# Patient Record
Sex: MALE | Race: WHITE | NOT HISPANIC OR LATINO
[De-identification: names, ages, dates, MRNs, and addresses within clinical notes are randomized per-mention and may not be internally consistent; named-entity substitution may affect disease eponyms.]

---

## 2023-01-13 PROBLEM — Z00.00 ENCOUNTER FOR PREVENTIVE HEALTH EXAMINATION: Status: ACTIVE | Noted: 2023-01-13

## 2023-01-16 ENCOUNTER — APPOINTMENT (OUTPATIENT)
Dept: OTOLARYNGOLOGY | Facility: CLINIC | Age: 43
End: 2023-01-16
Payer: COMMERCIAL

## 2023-01-16 ENCOUNTER — NON-APPOINTMENT (OUTPATIENT)
Age: 43
End: 2023-01-16

## 2023-01-16 VITALS
BODY MASS INDEX: 23.62 KG/M2 | HEART RATE: 91 BPM | WEIGHT: 165 LBS | DIASTOLIC BLOOD PRESSURE: 84 MMHG | SYSTOLIC BLOOD PRESSURE: 117 MMHG | HEIGHT: 70 IN

## 2023-01-16 DIAGNOSIS — G47.30 SLEEP APNEA, UNSPECIFIED: ICD-10-CM

## 2023-01-16 PROCEDURE — 99203 OFFICE O/P NEW LOW 30 MIN: CPT | Mod: 25

## 2023-01-16 PROCEDURE — 31575 DIAGNOSTIC LARYNGOSCOPY: CPT

## 2023-01-16 NOTE — PHYSICAL EXAM
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 sensations intact.  [de-identified] : ALESSANDRA

## 2023-01-16 NOTE — ASSESSMENT
[FreeTextEntry1] : Reviewed and reconciled medications, allergies, PMHx, PSHx, SocHx, FMHx.\par \par c/o waking up frequently during the night, wakes up thinking he has to urinate, but nothing comes out when he goes to the bathroom, wakes up with headache and dry mouth, nose feels blocked at night\par \par Physical Exam -\par mildly deviated septum , mild obstruction, mildly inflamed turbs b/l\par tonsils asymmetric class 3 right, class 2 left, mild narrowing airway\par \par Flexible laryngoscopy \par turb hypertrophy, nasopharynx normal, mild narrowing at level of soft palate no obstruction, clear down to level of larynx, lingual tonsil hypertrophy, right tonsil larger than left\par \par Plan:\par Flexible laryngoscopy. At home Sleep study ordered. FU after test.

## 2023-01-16 NOTE — REVIEW OF SYSTEMS
[Sneezing] : sneezing [Seasonal Allergies] : seasonal allergies [Nose Bleeds] : nose bleeds [Hoarseness] : hoarseness [Throat Clearing] : throat clearing [Negative] : Heme/Lymph [FreeTextEntry1] : daytime sleepiness, fatigue

## 2023-01-16 NOTE — PROCEDURE
[None] : none [Flexible Endoscope] : examined with the flexible endoscope [de-identified] : Procedure: Flexible Fiberoptic Laryngoscopy: Risks, benefits, and alternatives of flexible laryngoscopy were explained to the patient. The patient gave oral consent to proceed. The flexible scope was inserted into the left nasal cavity and advanced towards the nasopharynx. Visualized mucosa over the turbinates and septum were as described as above. Turbinate hypertrophy. The nasopharynx was clear. Oropharyngeal walls were symmetric and mobile without lesion, mass, or edema. mild narrowing at level of soft palate no obstruction. Hypopharynx was also without lesion or edema. Larynx was mobile without lesions. lingual tonsil hypertrophy, right tonsil larger than left. True vocal folds were white without mass or lesion. Base of tongue was within normal limits. \par  [de-identified] : assess airway

## 2023-01-16 NOTE — CONSULT LETTER
[Dear  ___] : Dear  [unfilled], [Consult Letter:] : I had the pleasure of evaluating your patient, [unfilled]. [Please see my note below.] : Please see my note below. [Consult Closing:] : Thank you very much for allowing me to participate in the care of this patient.  If you have any questions, please do not hesitate to contact me. [Sincerely,] : Sincerely, [FreeTextEntry3] : Kervin Rush MD FACS

## 2023-01-16 NOTE — HISTORY OF PRESENT ILLNESS
[de-identified] : Pt presents today c/o waking up frequently during the night, wakes up thinking he has to urinate, but nothing comes out when he goes to the bathroom, wakes up with headache and dry mouth, nose feels blocked at night. Pt notes he had slight improvement with nasal strips and humidifier. Pt notes he saw PCP to check bladder function and it came back normal. Pt notes he has been trying saline spray. Pt denies getting a previous sleep study done. Pt notes he feels tired during the day. Pt notes he tosses and turns during the night.

## 2023-01-16 NOTE — ADDENDUM
[FreeTextEntry1] : Documented by Imelda Jarrett acting as scribe for Dr. Rush on 01/16/2023.\par \par All Medical record entries made by the scribe were at my, Dr. Rush,direction and personally dictated by me on 01/16/2023. I have reviewed the chart and agree that the record accurately reflects my personal performance of the history, physical exam, assessment and plan. I have also personally directed, reviewed, and agreed with the discharge instructions.

## 2023-03-24 ENCOUNTER — OUTPATIENT (OUTPATIENT)
Dept: OUTPATIENT SERVICES | Facility: HOSPITAL | Age: 43
LOS: 1 days | End: 2023-03-24
Payer: COMMERCIAL

## 2023-03-24 DIAGNOSIS — G47.33 OBSTRUCTIVE SLEEP APNEA (ADULT) (PEDIATRIC): ICD-10-CM

## 2023-03-24 PROCEDURE — 95800 SLP STDY UNATTENDED: CPT

## 2023-04-07 ENCOUNTER — APPOINTMENT (OUTPATIENT)
Dept: OTOLARYNGOLOGY | Facility: CLINIC | Age: 43
End: 2023-04-07
Payer: COMMERCIAL

## 2023-04-07 VITALS
WEIGHT: 165 LBS | BODY MASS INDEX: 23.62 KG/M2 | HEIGHT: 70 IN | SYSTOLIC BLOOD PRESSURE: 111 MMHG | HEART RATE: 80 BPM | DIASTOLIC BLOOD PRESSURE: 73 MMHG

## 2023-04-07 DIAGNOSIS — J34.3 HYPERTROPHY OF NASAL TURBINATES: ICD-10-CM

## 2023-04-07 DIAGNOSIS — J35.1 HYPERTROPHY OF TONSILS: ICD-10-CM

## 2023-04-07 DIAGNOSIS — J34.2 DEVIATED NASAL SEPTUM: ICD-10-CM

## 2023-04-07 DIAGNOSIS — R51.9 HEADACHE, UNSPECIFIED: ICD-10-CM

## 2023-04-07 PROCEDURE — 99214 OFFICE O/P EST MOD 30 MIN: CPT

## 2023-04-07 NOTE — HISTORY OF PRESENT ILLNESS
[de-identified] : Patient with h/o stuffy nose, headache, dry mouth, waking up frequently during the night, and feeling tired during the day presents today for sleep study results. Patient states that he finds himself snoring when sleeping on his back. Patient states it feels like his nose closes up at night but during the day breathing through his nose is fine. Patient states not waking up wit a dry mouth too often

## 2023-04-07 NOTE — ASSESSMENT
[FreeTextEntry1] : Reviewed and reconciled medications, allergies, PMHx, PSHx, SocHx, FMHx \par \par Sleep Study 03/24/23:\par -not significant sleep apnea\par -O2 got to 85%\par -most of the time he was at 95%\par -recommendation was avoid sleeping on back, sleep hygiene, and weight loss\par \par Plan: Sleep study results discussed with patient. FU with a neurologist for headaches. If snoring becomes an issue R/B/A of tonsillectomy discussed. Can try using Flonase at night. More than 50% of visit spent in discussion. FU PRN.\par \par \par

## 2023-04-07 NOTE — CONSULT LETTER
[Dear  ___] : Dear  [unfilled], [Courtesy Letter:] : I had the pleasure of seeing your patient, [unfilled], in my office today. [Please see my note below.] : Please see my note below. [Consult Closing:] : Thank you very much for allowing me to participate in the care of this patient.  If you have any questions, please do not hesitate to contact me. [Sincerely,] : Sincerely, [FreeTextEntry1] : Kervin Rush MD FACS